# Patient Record
Sex: MALE | URBAN - METROPOLITAN AREA
[De-identification: names, ages, dates, MRNs, and addresses within clinical notes are randomized per-mention and may not be internally consistent; named-entity substitution may affect disease eponyms.]

---

## 2020-12-10 ENCOUNTER — APPOINTMENT (RX ONLY)
Dept: URBAN - METROPOLITAN AREA CLINIC 292 | Facility: CLINIC | Age: 40
Setting detail: DERMATOLOGY
End: 2020-12-10

## 2020-12-10 VITALS — TEMPERATURE: 98.4 F

## 2020-12-10 DIAGNOSIS — A63.0 ANOGENITAL (VENEREAL) WARTS: ICD-10-CM

## 2020-12-10 PROCEDURE — ? PRESCRIPTION

## 2020-12-10 PROCEDURE — ? LIQUID NITROGEN

## 2020-12-10 PROCEDURE — 17110 DESTRUCTION B9 LES UP TO 14: CPT

## 2020-12-10 RX ORDER — IMIQUIMOD 50 MG/G
CREAM TOPICAL TIW
Qty: 2 | Refills: 3 | Status: CANCELLED

## 2020-12-10 ASSESSMENT — LOCATION DETAILED DESCRIPTION DERM
LOCATION DETAILED: BASE OF THE PENIS
LOCATION DETAILED: LEFT DORSAL SHAFT OF PENIS
LOCATION DETAILED: RIGHT DORSAL SHAFT OF PENIS

## 2020-12-10 ASSESSMENT — LOCATION SIMPLE DESCRIPTION DERM: LOCATION SIMPLE: PENIS

## 2020-12-10 ASSESSMENT — LOCATION ZONE DERM: LOCATION ZONE: PENIS

## 2021-01-14 ENCOUNTER — APPOINTMENT (RX ONLY)
Dept: URBAN - METROPOLITAN AREA CLINIC 292 | Facility: CLINIC | Age: 41
Setting detail: DERMATOLOGY
End: 2021-01-14

## 2021-01-14 VITALS — TEMPERATURE: 98.2 F

## 2021-01-14 DIAGNOSIS — A63.0 ANOGENITAL (VENEREAL) WARTS: ICD-10-CM

## 2021-01-14 PROCEDURE — 99213 OFFICE O/P EST LOW 20 MIN: CPT

## 2021-01-14 PROCEDURE — ? ADDITIONAL NOTES

## 2021-01-14 PROCEDURE — ? PRESCRIPTION

## 2021-01-14 RX ORDER — IMIQUIMOD 50 MG/G
CREAM TOPICAL TIW
Qty: 2 | Refills: 3 | COMMUNITY
Start: 2021-01-14

## 2021-01-14 RX ADMIN — IMIQUIMOD: 50 CREAM TOPICAL at 00:00

## 2021-01-14 ASSESSMENT — LOCATION SIMPLE DESCRIPTION DERM: LOCATION SIMPLE: PENIS

## 2021-01-14 ASSESSMENT — LOCATION ZONE DERM: LOCATION ZONE: PENIS

## 2021-01-14 ASSESSMENT — LOCATION DETAILED DESCRIPTION DERM: LOCATION DETAILED: LEFT DORSAL SHAFT OF PENIS

## 2021-01-14 NOTE — PROCEDURE: ADDITIONAL NOTES
Additional Notes: 1 lesion treated with LN2.
Detail Level: Simple
Render Risk Assessment In Note?: no